# Patient Record
Sex: MALE | Race: WHITE | NOT HISPANIC OR LATINO | Employment: OTHER | URBAN - METROPOLITAN AREA
[De-identification: names, ages, dates, MRNs, and addresses within clinical notes are randomized per-mention and may not be internally consistent; named-entity substitution may affect disease eponyms.]

---

## 2021-04-20 ENCOUNTER — AMBULATORY - HEALTHEAST (OUTPATIENT)
Dept: SURGERY | Facility: CLINIC | Age: 76
End: 2021-04-20

## 2021-04-20 DIAGNOSIS — Z11.59 ENCOUNTER FOR SCREENING FOR OTHER VIRAL DISEASES: ICD-10-CM

## 2021-04-22 ENCOUNTER — AMBULATORY - HEALTHEAST (OUTPATIENT)
Dept: MULTI SPECIALTY CLINIC | Facility: CLINIC | Age: 76
End: 2021-04-22

## 2021-04-22 LAB
ALT SERPL W/O P-5'-P-CCNC: 34 IU/L (ref 8–45)
AST SERPL-CCNC: 34 IU/L (ref 2–40)

## 2021-05-04 ENCOUNTER — RECORDS - HEALTHEAST (OUTPATIENT)
Dept: ADMINISTRATIVE | Facility: OTHER | Age: 76
End: 2021-05-04

## 2021-05-04 ASSESSMENT — MIFFLIN-ST. JEOR: SCORE: 1592.21

## 2021-05-05 ENCOUNTER — RECORDS - HEALTHEAST (OUTPATIENT)
Dept: ADMINISTRATIVE | Facility: OTHER | Age: 76
End: 2021-05-05

## 2021-05-05 ASSESSMENT — MIFFLIN-ST. JEOR: SCORE: 1628.5

## 2021-05-09 ENCOUNTER — ANESTHESIA - HEALTHEAST (OUTPATIENT)
Dept: SURGERY | Facility: CLINIC | Age: 76
End: 2021-05-09

## 2021-05-10 ENCOUNTER — SURGERY - HEALTHEAST (OUTPATIENT)
Dept: SURGERY | Facility: CLINIC | Age: 76
End: 2021-05-10
Payer: COMMERCIAL

## 2021-05-10 ENCOUNTER — RECORDS - HEALTHEAST (OUTPATIENT)
Dept: ADMINISTRATIVE | Facility: OTHER | Age: 76
End: 2021-05-10

## 2021-05-10 ASSESSMENT — MIFFLIN-ST. JEOR: SCORE: 1611.78

## 2021-05-27 VITALS — HEIGHT: 69 IN | WEIGHT: 190 LBS | BODY MASS INDEX: 28.06 KG/M2

## 2021-05-27 VITALS — BODY MASS INDEX: 28.69 KG/M2 | WEIGHT: 194.31 LBS | BODY MASS INDEX: 29.24 KG/M2 | HEIGHT: 69 IN

## 2021-05-27 VITALS — HEIGHT: 69 IN | BODY MASS INDEX: 28.06 KG/M2

## 2021-05-27 VITALS — WEIGHT: 198 LBS | BODY MASS INDEX: 29.24 KG/M2

## 2021-06-16 PROBLEM — Z96.651 STATUS POST REVISION OF TOTAL REPLACEMENT OF RIGHT KNEE: Status: ACTIVE | Noted: 2021-05-11

## 2021-06-17 NOTE — ANESTHESIA POSTPROCEDURE EVALUATION
Patient: Satya Maldonado  Procedure(s):  REVISION, TOTAL KNEE  ARTHROPLASTY (Right)  Anesthesia type: spinal    Patient location: PACU  Last vitals:   Vitals Value Taken Time   /68 05/10/21 1700   Temp 36.1  C (97  F) 05/10/21 1640   Pulse 79 05/10/21 1710   Resp 16 05/10/21 1700   SpO2 94 % 05/10/21 1710   Vitals shown include unvalidated device data.  Post vital signs: stable  Level of consciousness: awake and responds to simple questions  Post-anesthesia pain: pain controlled  Post-anesthesia nausea and vomiting: no  Pulmonary: unassisted, return to baseline  Cardiovascular: stable and blood pressure at baseline  Hydration: adequate  Anesthetic events: no    QCDR Measures:  ASA# 11 - Carmita-op Cardiac Arrest: ASA11B - Patient did NOT experience unanticipated cardiac arrest  ASA# 12 - Carmita-op Mortality Rate: ASA12B - Patient did NOT die  ASA# 13 - PACU Re-Intubation Rate: ASA13B - Patient did NOT require a new airway mgmt  ASA# 10 - Composite Anes Safety: ASA10A - No serious adverse event    Additional Notes:

## 2021-06-17 NOTE — ANESTHESIA PROCEDURE NOTES
Peripheral Block    Patient location during procedure: pre-op  Start time: 5/10/2021 12:15 PM  End time: 5/10/2021 12:18 PM  post-op analgesia per surgeon order as noted in medical record  Staffing:  Performing  Anesthesiologist: Chantal Santacruz MD  Preanesthetic Checklist  Completed: patient identified, site marked, risks, benefits, and alternatives discussed, timeout performed, consent obtained, airway assessed, oxygen available, suction available, emergency drugs available and hand hygiene performed  Peripheral Block  Block type: other, tibial  Prep: ChloraPrep  Patient position: supine  Patient monitoring: cardiac monitor, continuous pulse oximetry, heart rate and blood pressure  Laterality: right  Injection technique: ultrasound guided    Ultrasound used to visualize needle placement in proximity to nerve being blocked: yes   US used to visualize anesthetic spread  Visualized anatomic structures normal  No Pathological Findings  Permanent ultrasound image captured for medical record  Sterile gel and probe cover used for ultrasound.  Needle  Needle type: echogenic   Needle gauge: 20G  Needle length: 4 in  no peripheral nerve catheter placed  Assessment  Injection assessment: no difficulty with injection, negative aspiration for heme, no paresthesia on injection and incremental injection  Additional Notes    Chantal Santacruz MD  Anesthesiologist  [facility]

## 2021-06-17 NOTE — ANESTHESIA CARE TRANSFER NOTE
Last vitals:   Vitals:    05/10/21 1610   BP: 123/67   Pulse: (P) 72   Resp: (P) 16   Temp: (P) 36.2  C (97.2  F)   SpO2: (P) 98%     Patient's level of consciousness is drowsy  Spontaneous respirations: yes  Maintains airway independently: yes  Dentition unchanged: yes  Oropharynx: oropharynx clear of all foreign objects    QCDR Measures:  ASA# 20 - Surgical Safety Checklist: WHO surgical safety checklist completed prior to induction    PQRS# 430 - Adult PONV Prevention: 4558F - Pt received => 2 anti-emetic agents (different classes) preop & intraop  ASA# 8 - Peds PONV Prevention: NA - Not pediatric patient, not GA or 2 or more risk factors NOT present  PQRS# 424 - Carmita-op Temp Management: 4559F - At least one body temp DOCUMENTED => 35.5C or 95.9F within required timeframe  PQRS# 426 - PACU Transfer Protocol: - Transfer of care checklist used  ASA# 14 - Acute Post-op Pain: ASA14B - Patient did NOT experience pain >= 7 out of 10

## 2021-06-17 NOTE — ANESTHESIA PREPROCEDURE EVALUATION
Anesthesia Evaluation      Patient summary reviewed   No history of anesthetic complications     Airway   Mallampati: III  Neck ROM: full   Pulmonary - negative ROS and normal exam                          Cardiovascular   Exercise tolerance: > or = 4 METS  (+) , hypercholesterolemia,     (-) dysrhythmias, angina  Rhythm: regular        Neuro/Psych - negative ROS     Endo/Other    (+) arthritis,      GI/Hepatic/Renal - negative ROS      Other findings:     NPO > 8 hrs        Dental - normal exam                        Anesthesia Plan  Planned anesthetic: spinal and peripheral nerve block    ASA 2     Anesthetic plan and risks discussed with: patient  Anesthesia plan special considerations: antiemetics,   Post-op plan: routine recovery

## 2021-06-17 NOTE — ANESTHESIA PROCEDURE NOTES
Peripheral Block    Patient location during procedure: pre-op  Start time: 5/10/2021 12:18 PM  End time: 5/10/2021 12:19 PM  post-op analgesia per surgeon order as noted in medical record  Staffing:  Performing  Anesthesiologist: Chantal Santacruz MD  Preanesthetic Checklist  Completed: patient identified, site marked, risks, benefits, and alternatives discussed, timeout performed, consent obtained, airway assessed, oxygen available, suction available, emergency drugs available and hand hygiene performed  Peripheral Block  Block type: saphenous, adductor canal block  Prep: ChloraPrep  Patient position: supine  Patient monitoring: cardiac monitor, continuous pulse oximetry, heart rate and blood pressure  Laterality: right  Injection technique: ultrasound guided    Ultrasound used to visualize needle placement in proximity to nerve being blocked: yes   US used to visualize anesthetic spread  Visualized anatomic structures normal  No Pathological Findings  Permanent ultrasound image captured for medical record  Sterile gel and probe cover used for ultrasound.  Needle  Needle type: echogenic   Needle gauge: 20G  Needle length: 4 in  no peripheral nerve catheter placed  Assessment  Injection assessment: no difficulty with injection, negative aspiration for heme, no paresthesia on injection and incremental injection  Additional Notes    Chantal Santacruz MD  Anesthesiologist  [facility]

## 2021-06-17 NOTE — ANESTHESIA PROCEDURE NOTES
Spinal Block    Patient location during procedure: OR  Start time: 5/10/2021 12:41 PM  End time: 5/10/2021 12:48 PM  Reason for block: primary anesthetic    Staffing:  Performing  Anesthesiologist: Chantal Santacruz MD    Preanesthetic Checklist  Completed: patient identified, risks, benefits, and alternatives discussed, timeout performed, consent obtained, airway assessed, oxygen available, suction available, emergency drugs available and hand hygiene performed  Spinal Block  Patient position: sitting  Prep: ChloraPrep and site prepped and draped  Patient monitoring: heart rate, continuous pulse ox and blood pressure  Approach: midline  Location: L3-4  Injection technique: single-shot  Needle type: pencil-tip   Needle gauge: 24 G      Additional Notes:    Patient tolerated procedure well.      Chantal Santacruz MD  Anesthesiologist  [facility]

## 2021-06-23 ENCOUNTER — RECORDS - HEALTHEAST (OUTPATIENT)
Dept: ADMINISTRATIVE | Facility: OTHER | Age: 76
End: 2021-06-23

## 2021-06-29 DIAGNOSIS — Z11.59 ENCOUNTER FOR SCREENING FOR OTHER VIRAL DISEASES: ICD-10-CM

## 2021-09-17 RX ORDER — CALCIUM/MAGNESIUM/ZINC 333-133 MG
1 TABLET ORAL DAILY
COMMUNITY

## 2021-09-23 NOTE — PROVIDER NOTIFICATION
Discharge plan according to [facility]:       09/17/21 0912   Discharge Planning   Patient/Family Anticipates Transition to home   Living Arrangements   People in home spouse   Type of Residence Private Residence   Is your private residence a single family home or apartment? Single family home   Bathroom Shower/Tub Walk-in shower   Support System   Support Systems Spouse/Significant Other

## 2021-09-29 ENCOUNTER — ANESTHESIA EVENT (OUTPATIENT)
Dept: SURGERY | Facility: CLINIC | Age: 76
End: 2021-09-29
Payer: MEDICARE

## 2021-09-30 ENCOUNTER — ANCILLARY PROCEDURE (OUTPATIENT)
Dept: ULTRASOUND IMAGING | Facility: CLINIC | Age: 76
End: 2021-09-30
Attending: ANESTHESIOLOGY
Payer: MEDICARE

## 2021-09-30 ENCOUNTER — HOSPITAL ENCOUNTER (OUTPATIENT)
Facility: CLINIC | Age: 76
Discharge: HOME | End: 2021-10-01
Attending: ORTHOPAEDIC SURGERY | Admitting: ORTHOPAEDIC SURGERY
Payer: MEDICARE

## 2021-09-30 ENCOUNTER — APPOINTMENT (OUTPATIENT)
Dept: PHYSICAL THERAPY | Facility: CLINIC | Age: 76
End: 2021-09-30
Attending: ORTHOPAEDIC SURGERY
Payer: MEDICARE

## 2021-09-30 ENCOUNTER — ANESTHESIA (OUTPATIENT)
Dept: SURGERY | Facility: CLINIC | Age: 76
End: 2021-09-30
Payer: MEDICARE

## 2021-09-30 ENCOUNTER — APPOINTMENT (OUTPATIENT)
Dept: RADIOLOGY | Facility: CLINIC | Age: 76
End: 2021-09-30
Attending: PHYSICIAN ASSISTANT
Payer: MEDICARE

## 2021-09-30 DIAGNOSIS — Z96.652 STATUS POST REVISION OF TOTAL REPLACEMENT OF LEFT KNEE: Primary | ICD-10-CM

## 2021-09-30 PROBLEM — T84.093D: Status: ACTIVE | Noted: 2021-09-30

## 2021-09-30 LAB
ABO/RH(D): NORMAL
ANTIBODY SCREEN: NEGATIVE
APPEARANCE FLD: ABNORMAL
COLOR FLD: YELLOW
GRAM STAIN RESULT: ABNORMAL
GRAM STAIN RESULT: ABNORMAL
GRAM STAIN RESULT: NORMAL
GRAM STAIN RESULT: NORMAL
LYMPHOCYTES NFR FLD MANUAL: 70 %
MONOS+MACROS NFR FLD MANUAL: 12 %
NEUTS BAND NFR FLD MANUAL: 18 %
RBC # FLD: ABNORMAL /UL
SPECIMEN EXPIRATION DATE: NORMAL
WBC # FLD AUTO: 600 /UL

## 2021-09-30 PROCEDURE — 999N000141 HC STATISTIC PRE-PROCEDURE NURSING ASSESSMENT: Performed by: ORTHOPAEDIC SURGERY

## 2021-09-30 PROCEDURE — 250N000009 HC RX 250: Performed by: ANESTHESIOLOGY

## 2021-09-30 PROCEDURE — 250N000009 HC RX 250: Performed by: PHYSICIAN ASSISTANT

## 2021-09-30 PROCEDURE — 250N000011 HC RX IP 250 OP 636: Performed by: NURSE ANESTHETIST, CERTIFIED REGISTERED

## 2021-09-30 PROCEDURE — 250N000011 HC RX IP 250 OP 636: Performed by: ANESTHESIOLOGY

## 2021-09-30 PROCEDURE — 258N000003 HC RX IP 258 OP 636: Performed by: PHYSICIAN ASSISTANT

## 2021-09-30 PROCEDURE — 258N000001 HC RX 258: Performed by: ORTHOPAEDIC SURGERY

## 2021-09-30 PROCEDURE — 250N000011 HC RX IP 250 OP 636: Performed by: PHYSICIAN ASSISTANT

## 2021-09-30 PROCEDURE — 250N000013 HC RX MED GY IP 250 OP 250 PS 637: Performed by: PHYSICIAN ASSISTANT

## 2021-09-30 PROCEDURE — 97161 PT EVAL LOW COMPLEX 20 MIN: CPT | Mod: GP

## 2021-09-30 PROCEDURE — 258N000003 HC RX IP 258 OP 636: Performed by: ANESTHESIOLOGY

## 2021-09-30 PROCEDURE — 278N000051 HC OR IMPLANT GENERAL: Performed by: ORTHOPAEDIC SURGERY

## 2021-09-30 PROCEDURE — 97110 THERAPEUTIC EXERCISES: CPT | Mod: GP

## 2021-09-30 PROCEDURE — C1776 JOINT DEVICE (IMPLANTABLE): HCPCS | Performed by: ORTHOPAEDIC SURGERY

## 2021-09-30 PROCEDURE — 370N000017 HC ANESTHESIA TECHNICAL FEE, PER MIN: Performed by: ORTHOPAEDIC SURGERY

## 2021-09-30 PROCEDURE — 710N000010 HC RECOVERY PHASE 1, LEVEL 2, PER MIN: Performed by: ORTHOPAEDIC SURGERY

## 2021-09-30 PROCEDURE — 89051 BODY FLUID CELL COUNT: CPT | Performed by: ORTHOPAEDIC SURGERY

## 2021-09-30 PROCEDURE — 360N000078 HC SURGERY LEVEL 5, PER MIN: Performed by: ORTHOPAEDIC SURGERY

## 2021-09-30 PROCEDURE — 999N000065 XR KNEE PORT LEFT 1/2 VIEWS

## 2021-09-30 PROCEDURE — 272N000001 HC OR GENERAL SUPPLY STERILE: Performed by: ORTHOPAEDIC SURGERY

## 2021-09-30 PROCEDURE — 250N000013 HC RX MED GY IP 250 OP 250 PS 637: Performed by: ANESTHESIOLOGY

## 2021-09-30 PROCEDURE — 89050 BODY FLUID CELL COUNT: CPT | Performed by: ORTHOPAEDIC SURGERY

## 2021-09-30 PROCEDURE — 36415 COLL VENOUS BLD VENIPUNCTURE: CPT | Performed by: PHYSICIAN ASSISTANT

## 2021-09-30 PROCEDURE — 97116 GAIT TRAINING THERAPY: CPT | Mod: GP

## 2021-09-30 PROCEDURE — 87205 SMEAR GRAM STAIN: CPT | Performed by: ORTHOPAEDIC SURGERY

## 2021-09-30 PROCEDURE — 87075 CULTR BACTERIA EXCEPT BLOOD: CPT | Performed by: ORTHOPAEDIC SURGERY

## 2021-09-30 PROCEDURE — 87070 CULTURE OTHR SPECIMN AEROBIC: CPT | Performed by: ORTHOPAEDIC SURGERY

## 2021-09-30 PROCEDURE — 250N000009 HC RX 250: Performed by: NURSE ANESTHETIST, CERTIFIED REGISTERED

## 2021-09-30 PROCEDURE — 86900 BLOOD TYPING SEROLOGIC ABO: CPT | Performed by: PHYSICIAN ASSISTANT

## 2021-09-30 DEVICE — SIGMA FEMORAL TC3 CEMENTED 4 LEFT
Type: IMPLANTABLE DEVICE | Site: KNEE | Status: FUNCTIONAL
Brand: SIGMA

## 2021-09-30 DEVICE — P.F.C. SIGMA FEMORAL ADAPTER BOLT +2/-2
Type: IMPLANTABLE DEVICE | Site: KNEE | Status: FUNCTIONAL
Brand: P.F.C. SIGMA

## 2021-09-30 DEVICE — CEMENT BONE SIMPLEX HV 40G W/GENTA .5G: Type: IMPLANTABLE DEVICE | Site: KNEE | Status: FUNCTIONAL

## 2021-09-30 DEVICE — P.F.C. SIGMA POSTERIOR AUGMENT COMBO CEMENTED SIZE 4 8MM
Type: IMPLANTABLE DEVICE | Site: KNEE | Status: FUNCTIONAL
Brand: P.F.C. SIGMA

## 2021-09-30 DEVICE — UNIVERSAL STEM FLUTED 75MM X 20MM: Type: IMPLANTABLE DEVICE | Site: KNEE | Status: FUNCTIONAL

## 2021-09-30 DEVICE — UNIVERSAL FEMORAL SLEEVE FULL POROUS 31MM: Type: IMPLANTABLE DEVICE | Site: KNEE | Status: FUNCTIONAL

## 2021-09-30 DEVICE — TIBIAL TRAY ROTATING PLATFORM M.B.T. REVISION SIZE 4 CEMENTED: Type: IMPLANTABLE DEVICE | Site: KNEE | Status: FUNCTIONAL

## 2021-09-30 DEVICE — P.F.C. SIGMA FEMORAL ADAPTER 5 DEGREE
Type: IMPLANTABLE DEVICE | Site: KNEE | Status: FUNCTIONAL
Brand: P.F.C. SIGMA

## 2021-09-30 DEVICE — UNIVERSAL STEM FLUTED 75MM X 18MM: Type: IMPLANTABLE DEVICE | Site: KNEE | Status: FUNCTIONAL

## 2021-09-30 DEVICE — SIGMA TIBIAL INSERT ROTATING PLATFORM TC3 SIZE 4 15MM GVF
Type: IMPLANTABLE DEVICE | Site: KNEE | Status: FUNCTIONAL
Brand: SIGMA

## 2021-09-30 DEVICE — M.B.T. REVISION METAPHYSEAL SLEEVE POROUS 61MM: Type: IMPLANTABLE DEVICE | Site: KNEE | Status: FUNCTIONAL

## 2021-09-30 RX ORDER — CEFAZOLIN SODIUM 2 G/100ML
2 INJECTION, SOLUTION INTRAVENOUS
Status: COMPLETED | OUTPATIENT
Start: 2021-09-30 | End: 2021-09-30

## 2021-09-30 RX ORDER — FENTANYL CITRATE 50 UG/ML
50 INJECTION, SOLUTION INTRAMUSCULAR; INTRAVENOUS
Status: DISCONTINUED | OUTPATIENT
Start: 2021-09-30 | End: 2021-09-30 | Stop reason: HOSPADM

## 2021-09-30 RX ORDER — BISACODYL 10 MG
10 SUPPOSITORY, RECTAL RECTAL DAILY PRN
Status: DISCONTINUED | OUTPATIENT
Start: 2021-09-30 | End: 2021-10-01 | Stop reason: HOSPADM

## 2021-09-30 RX ORDER — POLYETHYLENE GLYCOL 3350 17 G/17G
17 POWDER, FOR SOLUTION ORAL DAILY
Status: DISCONTINUED | OUTPATIENT
Start: 2021-10-01 | End: 2021-10-01 | Stop reason: HOSPADM

## 2021-09-30 RX ORDER — SODIUM CHLORIDE, SODIUM LACTATE, POTASSIUM CHLORIDE, CALCIUM CHLORIDE 600; 310; 30; 20 MG/100ML; MG/100ML; MG/100ML; MG/100ML
INJECTION, SOLUTION INTRAVENOUS CONTINUOUS
Status: DISCONTINUED | OUTPATIENT
Start: 2021-09-30 | End: 2021-10-01 | Stop reason: HOSPADM

## 2021-09-30 RX ORDER — PROPOFOL 10 MG/ML
INJECTION, EMULSION INTRAVENOUS PRN
Status: DISCONTINUED | OUTPATIENT
Start: 2021-09-30 | End: 2021-09-30

## 2021-09-30 RX ORDER — PROCHLORPERAZINE MALEATE 5 MG
5 TABLET ORAL EVERY 6 HOURS PRN
Status: DISCONTINUED | OUTPATIENT
Start: 2021-09-30 | End: 2021-10-01 | Stop reason: HOSPADM

## 2021-09-30 RX ORDER — ACETAMINOPHEN 325 MG/1
975 TABLET ORAL EVERY 8 HOURS
Status: DISCONTINUED | OUTPATIENT
Start: 2021-09-30 | End: 2021-10-01 | Stop reason: HOSPADM

## 2021-09-30 RX ORDER — ONDANSETRON 4 MG/1
4 TABLET, ORALLY DISINTEGRATING ORAL EVERY 30 MIN PRN
Status: DISCONTINUED | OUTPATIENT
Start: 2021-09-30 | End: 2021-09-30 | Stop reason: HOSPADM

## 2021-09-30 RX ORDER — HYDROMORPHONE HCL IN WATER/PF 6 MG/30 ML
0.4 PATIENT CONTROLLED ANALGESIA SYRINGE INTRAVENOUS EVERY 5 MIN PRN
Status: DISCONTINUED | OUTPATIENT
Start: 2021-09-30 | End: 2021-09-30 | Stop reason: HOSPADM

## 2021-09-30 RX ORDER — MAGNESIUM SULFATE 4 G/50ML
4 INJECTION INTRAVENOUS ONCE
Status: COMPLETED | OUTPATIENT
Start: 2021-09-30 | End: 2021-09-30

## 2021-09-30 RX ORDER — TRANEXAMIC ACID 650 MG/1
1950 TABLET ORAL ONCE
Status: COMPLETED | OUTPATIENT
Start: 2021-09-30 | End: 2021-09-30

## 2021-09-30 RX ORDER — ONDANSETRON 4 MG/1
4 TABLET, ORALLY DISINTEGRATING ORAL EVERY 6 HOURS PRN
Status: DISCONTINUED | OUTPATIENT
Start: 2021-09-30 | End: 2021-10-01 | Stop reason: HOSPADM

## 2021-09-30 RX ORDER — HYDROMORPHONE HCL IN WATER/PF 6 MG/30 ML
0.2 PATIENT CONTROLLED ANALGESIA SYRINGE INTRAVENOUS
Status: DISCONTINUED | OUTPATIENT
Start: 2021-09-30 | End: 2021-10-01 | Stop reason: HOSPADM

## 2021-09-30 RX ORDER — SODIUM CHLORIDE, SODIUM LACTATE, POTASSIUM CHLORIDE, CALCIUM CHLORIDE 600; 310; 30; 20 MG/100ML; MG/100ML; MG/100ML; MG/100ML
INJECTION, SOLUTION INTRAVENOUS CONTINUOUS
Status: DISCONTINUED | OUTPATIENT
Start: 2021-09-30 | End: 2021-09-30 | Stop reason: HOSPADM

## 2021-09-30 RX ORDER — ACETAMINOPHEN 325 MG/1
650 TABLET ORAL EVERY 4 HOURS PRN
Status: DISCONTINUED | OUTPATIENT
Start: 2021-10-03 | End: 2021-10-01 | Stop reason: HOSPADM

## 2021-09-30 RX ORDER — ACETAMINOPHEN 325 MG/1
975 TABLET ORAL ONCE
Status: COMPLETED | OUTPATIENT
Start: 2021-09-30 | End: 2021-09-30

## 2021-09-30 RX ORDER — CEFAZOLIN SODIUM 2 G/100ML
2 INJECTION, SOLUTION INTRAVENOUS EVERY 8 HOURS
Status: COMPLETED | OUTPATIENT
Start: 2021-09-30 | End: 2021-10-01

## 2021-09-30 RX ORDER — IBUPROFEN 600 MG/1
600 TABLET, FILM COATED ORAL EVERY 6 HOURS PRN
Qty: 30 TABLET | Refills: 0 | Status: SHIPPED | OUTPATIENT
Start: 2021-09-30

## 2021-09-30 RX ORDER — HYDROXYZINE HYDROCHLORIDE 10 MG/1
10 TABLET, FILM COATED ORAL EVERY 6 HOURS PRN
Qty: 30 TABLET | Refills: 0 | Status: SHIPPED | OUTPATIENT
Start: 2021-09-30

## 2021-09-30 RX ORDER — CEFAZOLIN SODIUM 2 G/100ML
2 INJECTION, SOLUTION INTRAVENOUS SEE ADMIN INSTRUCTIONS
Status: DISCONTINUED | OUTPATIENT
Start: 2021-09-30 | End: 2021-09-30 | Stop reason: HOSPADM

## 2021-09-30 RX ORDER — NAPROXEN 250 MG/1
250 TABLET ORAL EVERY 12 HOURS PRN
Status: DISCONTINUED | OUTPATIENT
Start: 2021-09-30 | End: 2021-10-01 | Stop reason: HOSPADM

## 2021-09-30 RX ORDER — NALOXONE HYDROCHLORIDE 0.4 MG/ML
0.2 INJECTION, SOLUTION INTRAMUSCULAR; INTRAVENOUS; SUBCUTANEOUS
Status: DISCONTINUED | OUTPATIENT
Start: 2021-09-30 | End: 2021-10-01 | Stop reason: HOSPADM

## 2021-09-30 RX ORDER — LIDOCAINE 40 MG/G
CREAM TOPICAL
Status: DISCONTINUED | OUTPATIENT
Start: 2021-09-30 | End: 2021-10-01 | Stop reason: HOSPADM

## 2021-09-30 RX ORDER — LIDOCAINE 40 MG/G
CREAM TOPICAL
Status: DISCONTINUED | OUTPATIENT
Start: 2021-09-30 | End: 2021-09-30 | Stop reason: HOSPADM

## 2021-09-30 RX ORDER — FENTANYL CITRATE 50 UG/ML
INJECTION, SOLUTION INTRAMUSCULAR; INTRAVENOUS PRN
Status: DISCONTINUED | OUTPATIENT
Start: 2021-09-30 | End: 2021-09-30

## 2021-09-30 RX ORDER — AMOXICILLIN 250 MG
1 CAPSULE ORAL 2 TIMES DAILY
Status: DISCONTINUED | OUTPATIENT
Start: 2021-09-30 | End: 2021-10-01 | Stop reason: HOSPADM

## 2021-09-30 RX ORDER — BUPIVACAINE HYDROCHLORIDE 7.5 MG/ML
INJECTION, SOLUTION INTRASPINAL
Status: COMPLETED | OUTPATIENT
Start: 2021-09-30 | End: 2021-09-30

## 2021-09-30 RX ORDER — LIDOCAINE HYDROCHLORIDE 10 MG/ML
INJECTION, SOLUTION INFILTRATION; PERINEURAL PRN
Status: DISCONTINUED | OUTPATIENT
Start: 2021-09-30 | End: 2021-09-30

## 2021-09-30 RX ORDER — DIPHENHYDRAMINE HCL 12.5MG/5ML
12.5 LIQUID (ML) ORAL EVERY 6 HOURS PRN
Status: DISCONTINUED | OUTPATIENT
Start: 2021-09-30 | End: 2021-10-01 | Stop reason: HOSPADM

## 2021-09-30 RX ORDER — BUPIVACAINE HYDROCHLORIDE AND EPINEPHRINE 5; 5 MG/ML; UG/ML
INJECTION, SOLUTION PERINEURAL
Status: COMPLETED | OUTPATIENT
Start: 2021-09-30 | End: 2021-09-30

## 2021-09-30 RX ORDER — LABETALOL HYDROCHLORIDE 5 MG/ML
10 INJECTION, SOLUTION INTRAVENOUS
Status: DISCONTINUED | OUTPATIENT
Start: 2021-09-30 | End: 2021-09-30 | Stop reason: HOSPADM

## 2021-09-30 RX ORDER — DEXAMETHASONE SODIUM PHOSPHATE 10 MG/ML
INJECTION, SOLUTION INTRAMUSCULAR; INTRAVENOUS PRN
Status: DISCONTINUED | OUTPATIENT
Start: 2021-09-30 | End: 2021-09-30

## 2021-09-30 RX ORDER — FENTANYL CITRATE 50 UG/ML
50 INJECTION, SOLUTION INTRAMUSCULAR; INTRAVENOUS EVERY 5 MIN PRN
Status: DISCONTINUED | OUTPATIENT
Start: 2021-09-30 | End: 2021-09-30 | Stop reason: HOSPADM

## 2021-09-30 RX ORDER — PROPOFOL 10 MG/ML
INJECTION, EMULSION INTRAVENOUS CONTINUOUS PRN
Status: DISCONTINUED | OUTPATIENT
Start: 2021-09-30 | End: 2021-09-30

## 2021-09-30 RX ORDER — NALOXONE HYDROCHLORIDE 0.4 MG/ML
0.4 INJECTION, SOLUTION INTRAMUSCULAR; INTRAVENOUS; SUBCUTANEOUS
Status: DISCONTINUED | OUTPATIENT
Start: 2021-09-30 | End: 2021-10-01 | Stop reason: HOSPADM

## 2021-09-30 RX ORDER — HYDROXYZINE HYDROCHLORIDE 10 MG/1
10 TABLET, FILM COATED ORAL EVERY 6 HOURS PRN
Status: DISCONTINUED | OUTPATIENT
Start: 2021-09-30 | End: 2021-10-01 | Stop reason: HOSPADM

## 2021-09-30 RX ORDER — ONDANSETRON 2 MG/ML
4 INJECTION INTRAMUSCULAR; INTRAVENOUS EVERY 6 HOURS PRN
Status: DISCONTINUED | OUTPATIENT
Start: 2021-09-30 | End: 2021-10-01 | Stop reason: HOSPADM

## 2021-09-30 RX ORDER — OXYCODONE HYDROCHLORIDE 5 MG/1
5 TABLET ORAL EVERY 4 HOURS PRN
Status: DISCONTINUED | OUTPATIENT
Start: 2021-09-30 | End: 2021-09-30 | Stop reason: HOSPADM

## 2021-09-30 RX ORDER — ACETAMINOPHEN 325 MG/1
650 TABLET ORAL EVERY 4 HOURS PRN
Qty: 100 TABLET | Refills: 0 | Status: SHIPPED | OUTPATIENT
Start: 2021-09-30

## 2021-09-30 RX ORDER — ONDANSETRON 2 MG/ML
INJECTION INTRAMUSCULAR; INTRAVENOUS PRN
Status: DISCONTINUED | OUTPATIENT
Start: 2021-09-30 | End: 2021-09-30

## 2021-09-30 RX ORDER — OXYCODONE HYDROCHLORIDE 5 MG/1
10 TABLET ORAL EVERY 4 HOURS PRN
Status: DISCONTINUED | OUTPATIENT
Start: 2021-09-30 | End: 2021-10-01 | Stop reason: HOSPADM

## 2021-09-30 RX ORDER — HYDROMORPHONE HCL IN WATER/PF 6 MG/30 ML
0.4 PATIENT CONTROLLED ANALGESIA SYRINGE INTRAVENOUS
Status: DISCONTINUED | OUTPATIENT
Start: 2021-09-30 | End: 2021-10-01 | Stop reason: HOSPADM

## 2021-09-30 RX ORDER — OXYCODONE HYDROCHLORIDE 5 MG/1
5 TABLET ORAL EVERY 4 HOURS PRN
Status: DISCONTINUED | OUTPATIENT
Start: 2021-09-30 | End: 2021-10-01 | Stop reason: HOSPADM

## 2021-09-30 RX ORDER — AMOXICILLIN 250 MG
1-2 CAPSULE ORAL 2 TIMES DAILY
Qty: 30 TABLET | Refills: 1 | Status: SHIPPED | OUTPATIENT
Start: 2021-09-30

## 2021-09-30 RX ORDER — ASPIRIN 81 MG/1
81 TABLET ORAL 2 TIMES DAILY
Status: DISCONTINUED | OUTPATIENT
Start: 2021-09-30 | End: 2021-10-01 | Stop reason: HOSPADM

## 2021-09-30 RX ORDER — ONDANSETRON 2 MG/ML
4 INJECTION INTRAMUSCULAR; INTRAVENOUS EVERY 30 MIN PRN
Status: DISCONTINUED | OUTPATIENT
Start: 2021-09-30 | End: 2021-09-30 | Stop reason: HOSPADM

## 2021-09-30 RX ADMIN — ACETAMINOPHEN 975 MG: 325 TABLET ORAL at 14:52

## 2021-09-30 RX ADMIN — SENNOSIDES AND DOCUSATE SODIUM 1 TABLET: 50; 8.6 TABLET ORAL at 21:29

## 2021-09-30 RX ADMIN — LIDOCAINE HYDROCHLORIDE 30 MG: 10 INJECTION, SOLUTION INFILTRATION; PERINEURAL at 07:35

## 2021-09-30 RX ADMIN — MIDAZOLAM HYDROCHLORIDE 1 MG: 1 INJECTION, SOLUTION INTRAMUSCULAR; INTRAVENOUS at 07:51

## 2021-09-30 RX ADMIN — ASPIRIN 81 MG: 81 TABLET, COATED ORAL at 12:19

## 2021-09-30 RX ADMIN — SODIUM CHLORIDE, POTASSIUM CHLORIDE, SODIUM LACTATE AND CALCIUM CHLORIDE: 600; 310; 30; 20 INJECTION, SOLUTION INTRAVENOUS at 12:05

## 2021-09-30 RX ADMIN — SODIUM CHLORIDE, POTASSIUM CHLORIDE, SODIUM LACTATE AND CALCIUM CHLORIDE: 600; 310; 30; 20 INJECTION, SOLUTION INTRAVENOUS at 08:03

## 2021-09-30 RX ADMIN — PROPOFOL 75 MCG/KG/MIN: 10 INJECTION, EMULSION INTRAVENOUS at 07:35

## 2021-09-30 RX ADMIN — SODIUM CHLORIDE, POTASSIUM CHLORIDE, SODIUM LACTATE AND CALCIUM CHLORIDE: 600; 310; 30; 20 INJECTION, SOLUTION INTRAVENOUS at 14:51

## 2021-09-30 RX ADMIN — SODIUM CHLORIDE: 900 IRRIGANT IRRIGATION at 08:30

## 2021-09-30 RX ADMIN — ONDANSETRON 4 MG: 2 INJECTION INTRAMUSCULAR; INTRAVENOUS at 08:03

## 2021-09-30 RX ADMIN — CEFAZOLIN SODIUM 2 G: 2 INJECTION, SOLUTION INTRAVENOUS at 14:53

## 2021-09-30 RX ADMIN — SENNOSIDES AND DOCUSATE SODIUM 1 TABLET: 50; 8.6 TABLET ORAL at 12:19

## 2021-09-30 RX ADMIN — TRANEXAMIC ACID 1950 MG: 650 TABLET ORAL at 06:06

## 2021-09-30 RX ADMIN — BUPIVACAINE HYDROCHLORIDE IN DEXTROSE 2 ML: 7.5 INJECTION, SOLUTION SUBARACHNOID at 07:32

## 2021-09-30 RX ADMIN — SODIUM CHLORIDE, POTASSIUM CHLORIDE, SODIUM LACTATE AND CALCIUM CHLORIDE: 600; 310; 30; 20 INJECTION, SOLUTION INTRAVENOUS at 06:34

## 2021-09-30 RX ADMIN — FENTANYL CITRATE 50 MCG: 50 INJECTION, SOLUTION INTRAMUSCULAR; INTRAVENOUS at 07:27

## 2021-09-30 RX ADMIN — ACETAMINOPHEN 975 MG: 325 TABLET ORAL at 21:28

## 2021-09-30 RX ADMIN — MIDAZOLAM HYDROCHLORIDE 2 MG: 1 INJECTION, SOLUTION INTRAMUSCULAR; INTRAVENOUS at 06:58

## 2021-09-30 RX ADMIN — ACETAMINOPHEN 975 MG: 325 TABLET ORAL at 06:06

## 2021-09-30 RX ADMIN — CEFAZOLIN SODIUM 2 G: 2 INJECTION, SOLUTION INTRAVENOUS at 07:24

## 2021-09-30 RX ADMIN — ASPIRIN 81 MG: 81 TABLET, COATED ORAL at 21:29

## 2021-09-30 RX ADMIN — BUPIVACAINE HYDROCHLORIDE AND EPINEPHRINE BITARTRATE 15 ML: 5; .005 INJECTION, SOLUTION PERINEURAL at 07:19

## 2021-09-30 RX ADMIN — PROPOFOL 40 MG: 10 INJECTION, EMULSION INTRAVENOUS at 07:35

## 2021-09-30 RX ADMIN — MAGNESIUM SULFATE HEPTAHYDRATE 4 G: 80 INJECTION, SOLUTION INTRAVENOUS at 06:39

## 2021-09-30 RX ADMIN — FENTANYL CITRATE 100 MCG: 50 INJECTION, SOLUTION INTRAMUSCULAR; INTRAVENOUS at 06:58

## 2021-09-30 RX ADMIN — MIDAZOLAM HYDROCHLORIDE 1 MG: 1 INJECTION, SOLUTION INTRAMUSCULAR; INTRAVENOUS at 07:27

## 2021-09-30 RX ADMIN — DEXAMETHASONE SODIUM PHOSPHATE 10 MG: 10 INJECTION, SOLUTION INTRAMUSCULAR; INTRAVENOUS at 08:03

## 2021-09-30 RX ADMIN — HYDROXYZINE HYDROCHLORIDE 10 MG: 10 TABLET ORAL at 21:29

## 2021-09-30 ASSESSMENT — ACTIVITIES OF DAILY LIVING (ADL)
DIFFICULTY_EATING/SWALLOWING: NO
TOILETING_ISSUES: NO
DRESSING/BATHING_DIFFICULTY: NO
DOING_ERRANDS_INDEPENDENTLY_DIFFICULTY: NO
VISION_MANAGEMENT: GLASSES ALL THE TIME
PATIENT_/_FAMILY_COMMUNICATION_STYLE: SPOKEN LANGUAGE (ENGLISH OR BILINGUAL)
HEARING_DIFFICULTY_OR_DEAF: NO
DIFFICULTY_COMMUNICATING: NO
CONCENTRATING,_REMEMBERING_OR_MAKING_DECISIONS_DIFFICULTY: NO
WALKING_OR_CLIMBING_STAIRS_DIFFICULTY: NO
FALL_HISTORY_WITHIN_LAST_SIX_MONTHS: NO
WEAR_GLASSES_OR_BLIND: YES

## 2021-09-30 ASSESSMENT — MIFFLIN-ST. JEOR: SCORE: 1594.25

## 2021-09-30 NOTE — ANESTHESIA POSTPROCEDURE EVALUATION
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    Anesthesia Type:  No value filed.    Note:  Disposition: Inpatient   Postop Pain Control: Uneventful            Sign Out: Well controlled pain   PONV: No   Neuro/Psych: Uneventful            Sign Out: Acceptable/Baseline neuro status   Airway/Respiratory: Uneventful            Sign Out: Acceptable/Baseline resp. status   CV/Hemodynamics: Uneventful            Sign Out: Acceptable CV status; No obvious hypovolemia; No obvious fluid overload   Other NRE: NONE   DID A NON-ROUTINE EVENT OCCUR? No           Last vitals:  Vitals Value Taken Time   /71 09/30/21 1040   Temp 36.4  C (97.6  F) 09/30/21 1021   Pulse 64 09/30/21 1046   Resp 15 09/30/21 1046   SpO2 91 % 09/30/21 1046   Vitals shown include unvalidated device data.    Electronically Signed By: Chencho Rajput MD  September 30, 2021  10:47 AM

## 2021-09-30 NOTE — OP NOTE
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 DePuy Sigma TC 3 cemented femoral component, size 4 left  2.  5 degree femoral adapter with +2 posterior adapter bolt  3.  8 mm posterior lateral augment  4.  Fully porous femoral sleeve, 31 mm  5.  75 x 20 mm press-fit fluted stem    Tibia:  1.  DePuy rotating platform revision tibial tray, size 4  2.  61 mm MBT revision metaphyseal sleeve  3.  75 x 18 mm press-fit fluted stem  4.  Sigma TC 3 rotating platform polyethylene, size 4,  15 mm    SURGEON  Vasu Guardado, DO     ASSISTANT   Kenny Duval PA-C. PA-C was needed for positioning, draping, retraction/protection of vital structures, assistance with instrumentation and correct implant placement, deep periarticular injection, closure including deep capsular layer and maintaining patient safety throughout the procedure.  Several of these duties can only be performed by a BRY and not a lesser trained surgical assistant.    ANESTHESIA  Spinal with Adductor Block      ESTIMATED BLOOD LOSS  50 mL    TOURNIQUET TIME:  80 minutes    COMPLICATIONS   None.      FINDINGS: Moderate straw-colored joint effusion.  No purulence.  Significant synovial hypertrophy secondary to polyethylene wear.  Peripheral bone loss of femoral component.  Femur well fixed.  Tibia not grossly loose.  Severe central tibial metaphyseal osteolysis with 1 small uncontained defect.    Specimens:   Synovial fluid analysis: 600 WBC, PMN 18%  Cultures x 2    INDICATION   Satya Maldonado is a 76 year old male who was seen in my office for bilateral knee pain status  post remote total knee arthroplasty.  X-rays showed significant osteolysis with polyethylene wear of both knees.  Patient underwent a very successful right knee revision.  Continued to have significant left knee pain.  Large metaphyseal osteolytic lesion in the tibia.  Recommended revision surgery on this side as well.  Infection work-up was negative preoperatively.  The risks and benefits of further non-operative treatment vs surgical treatment were discussed.  Satya Maldonado wished to proceed with surgery. They were cleared by a medical doctor for joint arthroplasty.    INFORMED CONSENT  Satya Maldonado was identified in the preoperative holding area and was identified using medical record number, name, and date of birth, all of which were confirmed. The left knee was marked using an indelible marker and informed consent was signed. Once again, all risks and benefits as well as alternatives to surgical intervention were discussed with the patient in detail and all the questions were answered. Risks discussed included but were not limited to: persistent pain, recurrent infection, bleeding, scarring, stiffness, thromboembolic events, fracture, malalignment/malrotation, failure to reimplant prosthesis, severe limb dysfunction, loss of limb, and loss of life. The patient signed informed consent and wished to proceed with surgery as scheduled.     TECHNIQUE   Satya Maldonado received a peripheral nerve block in the preoperative holding area.  Patient was taken to the operating room and placed on the operating table in a supine position. A spinal was then performed in the operating room.  A tourniquet was placed high on the operative thigh.  Care was taken to pad all bony prominences well. A CHANDRAKANT stocking was placed on the contralateral leg.  The operative extremity was then prepped and draped in a standard orthopedic fashion using DuraPrep. A preprocedural timeout was then performed once again confirming the  patient's correct identity, correct procedure, correct side, and correct site. In addition, allergy status and required equipment were reviewed. Three independent members of the operating room team agreed on the above-mentioned parameters.  It was also confirmed that the patient received IV antibiotics and TXA per protocol.    At this point the leg was elevated and the tourniquet was inflated.  An extensile midline approach to the knee was utilized, through the patient's previous incision.  Sharp dissection was performed down to the level of the capsule.  An 18-gauge needle was used to aspirate the joint. Fluid was obtained and sent for synovial fluid analysis.  An extensive median parapatellar arthrotomy was performed. Scar tissue from the superior pouch and gutters was resected to reestablish these areas.  There was significant hypertrophy due to polyethylene wear.  Soft tissue was cleared from around the femoral and tibial implants.  The knee was extended and anterior scar tissue behind the patellar tendon was excised.  The patellar tendon was elevated down to the level of the tubercle.  The polyethylene was then removed with a osteotome.  The posterior knee was also debrided of scar.  Tissue cultures were sent at this time.    Attention was then taken to the femur.  It was found to be osteolytic around the periphery, but the component was not grossly loose.  With a combination of the reciprocating saw and osteotomes the femoral component was freed up.  A drift was used to impact the femoral flange and it was removed with minimal bone loss.  All remaining cement was removed with a rongeur.  Overall femoral bone stock was in good condition other than some small peripheral areas.    Attention was now taken to the tibia.  This component was found to not be grossly loose.  There was overgrowth of proximal bone over the implant.  The reciprocating saw was used to free up the anterior aspect of the tray.  The  reciprocating saw was used to work around the keel of the implant and posterior aspect.  A drift was used to impact the tibial component and this was also removed with minimal bone loss.  All excess cement was removed from the surface of the tibia and in the keel.  There was a very large void in the proximal tibia to include approximately 75% of the metaphyseal bone.  Overall the rim of the tibia as well as the metaphyseal cortex was intact.  The best intact bone was posteromedial and lateral.    An opening reamer followed by sequential reaming was performed in the tibia.  This was removed and a sleeve reamer was then used followed by broaches.  The 61 mm broach gave good rotational and axial stability.  There was still an anteromedial defect that was not filled by the implant, however this was the large size and I did obtain a very good metaphyseal press-fit.  I plan to fill this void with cement.  The broach was left in place and a freshening cut was made at the top of the tibia.  Excess bone and scar tissue from around the tibia was removed.  Tibial tray was then appropriately sized.  I elected to use a size 4 which was slightly undersized in order to not overhang laterally.  There was some uncovered bone anteromedial including some of the bony defect, but again I plan to fill this with cement.  Broach was removed and tibial trial was created on the back table.  This was impacted and found to have good position and fit.    Attention turned back to the femur.  Opening reamer followed by sequential reaming was performed.  Initial broach reamer was then used followed by sequential broaching.  Good rotational and axial stability was found with a 31 mm broach.  Distal femoral cutting guide was then placed on the broach and pinned.  1 mm freshening cut was made.  I then appropriately sized the femur and placed a 4-in-1 cutting block which was pinned into place.  External rotation was checked based on epicondylar axis  and rectangular flexion gap.  This was placed in the 2 mm posterior position.  Cuts were made with a sagittal saw.  At this time it was determined that augments would be needed, 8 mm posterolateral.  4-in-1 block was removed and the central box cutting guide was placed.  Box was cut in all pieces were removed.  Trial femur was built to match and impacted into place.  Good fit and stability.    A 15 mm trial polyethylene was then placed.  Knee was taken through range of motion and found to have full extension and full flexion.  Varus/valgus stability was balanced in extension, mid flexion and full flexion.  Patella tracked well. Trial components were then all removed.  Knee was copiously irrigated and dried.      Final components were built on the back table to match the trials exactly.  Antibiotic cement was then mixed and components were sequentially cemented, tibia first followed by femur.  Excess cement was removed from the components.  Trial polyethylene was placed and cement was allowed to polymerize in full extension.  Knee was taken through 1 final range of motion and the 15 mm polyethylene was the appropriate size.  Trial was removed.  Tourniquet was deflated and hemostasis was obtained.  Betadine lavage was then performed followed by one final irrigation.  A portion of the periarticular injection was placed in the posterior capsule as well as the periosteal tissues around the femur and tibia.  Final polyethylene was then placed and secured.    The wound was closed in layers with #1 StrataFix, #0 Vicryl, #2-0 stratafix and staples for skin.  Sterile aquacel dressing was applied. Followed by a full leg ACE wrap.     The patient was taken to the PACU in stable condition. The case was clean.  All sponge and needle counts were correct at the end of the case.    Dispo:  Patient can weight-bear as tolerated.  No formal restrictions.  We will follow cultures.  They will receive DVT prophylaxis with mechanical and  chemical means.     Implant Name Type Inv. Item Serial No.  Lot No. LRB No. Used Action   Tibial insert/poly    Depuy  Left 1 Explanted   Tibial component    Depuy  Left 1 Explanted   Femoral component    Depuy  Left 1 Explanted   STEM UNIVERSAL [id number] FLUTED - [id number] Total Joint Component/Insert STEM UNIVERSAL [id number] FLUTED  & [employer]- [id number] Left 1 Implanted   IMP WEDGE FEM DEPUY POST AUG PFC SZ 4 8MM  - [id number] Total Joint Component/Insert IMP WEDGE FEM DEPUY POST AUG PFC SZ 4 8MM   & [employer]- [id number] Left 1 Implanted   SLEEVE FEMORAL UNIVERSAL FULL POROUS 31M - [id number] Total Joint Component/Insert SLEEVE FEMORAL UNIVERSAL FULL POROUS 31M  & [employer]- [id number] Left 1 Implanted   TRAY TIBIA MBT CEMENTED KEEL SZ 4 3128- - [id number] Total Joint Component/Insert TRAY TIBIA MBT CEMENTED KEEL SZ 4 [id number]  & [employer]- [id number] Left 1 Implanted   STEM UNIVERSAL [id number] FLUTED - [id number] Total Joint Component/Insert STEM UNIVERSAL [id number] FLUTED  & [employer]- [id number] Left 1 Implanted   ADAPTER FEMORAL SIGMA 5DEG - [id number] Total Joint Component/Insert ADAPTER FEMORAL SIGMA 5DEG  & [employer]- [id number] Left 1 Implanted   ADAPTER FEMORAL SIGMA +2/-2 OFFSET BOLT - [id number] Total Joint Component/Insert ADAPTER FEMORAL SIGMA +2/-2 OFFSET BOLT  & [employer]- [id number] Left 1 Implanted   FEMORAL COMPONENT CS TC3 SZ 4 71/65 - [id number] Total Joint Component/Insert FEMORAL COMPONENT CS TC3 SZ 4 71/65  & [employer]- [id number] Left 1 Implanted   SLEEVE TRAY MBT POROUS M/L 61MM - [id number] Total Joint Component/Insert SLEEVE TRAY MBT POROUS M/L 61MM  & [employer]- [id number] Left 1 Implanted   CEMENT BONE SIMPLEX HV 40G W/GENTA .5G - [id number] Cement, Bone CEMENT BONE SIMPLEX HV 40G W/GENTA .5G  [employer] [id number] Left 2 Implanted   INSERT TIBIA TC3 RP SZ 4 15MM - [id number] Total Joint Component/Insert  INSERT TIBIA TC3 RP SZ 4 15MM  [facility]- [id number] Left 1 Implanted       Vasu Guardado DO

## 2021-09-30 NOTE — ANESTHESIA CARE TRANSFER NOTE
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    Anesthesia Type:   No value filed.     Note:    Oropharynx: oropharynx clear of all foreign objects and spontaneously breathing  Level of Consciousness: awake  Oxygen Supplementation: room air  Level of Supplemental Oxygen (L/min / FiO2): 0  Independent Airway: airway patency satisfactory and stable  Dentition: dentition unchanged  Vital Signs Stable: post-procedure vital signs reviewed and stable  Report to RN Given: handoff report given  Patient transferred to: PACU    Handoff Report: Identifed the Patient, Identified the Reponsible Provider, Reviewed the pertinent medical history, Discussed the surgical course, Reviewed Intra-OP anesthesia mangement and issues during anesthesia, Set expectations for post-procedure period and Allowed opportunity for questions and acknowledgement of understanding      Vitals:  Vitals Value Taken Time   /66 09/30/21 1022   Temp 36.4  C (97.6  F) 09/30/21 1021   Pulse 66 09/30/21 1021   Resp 16 09/30/21 1021   SpO2 97 % 09/30/21 1021   Vitals shown include unvalidated device data.    Electronically Signed By: MARCIN Lima CRNA  September 30, 2021  10:23 AM

## 2021-09-30 NOTE — ANESTHESIA PROCEDURE NOTES
Adductor canal Procedure Note    Pre-Procedure   Staff -        Anesthesiologist:  Chencho Rajput MD       Performed By: anesthesiologist       Location: pre-op       Procedure Start/Stop Times: 9/30/2021 6:59 AM and 9/30/2021 7:02 AM       Pre-Anesthestic Checklist: patient identified, IV checked, site marked, risks and benefits discussed, informed consent, monitors and equipment checked, pre-op evaluation, at physician/surgeon's request and post-op pain management  Timeout:       Correct Patient: Yes        Correct Procedure: Yes        Correct Site: Yes        Correct Position: Yes        Correct Laterality: Yes        Site Marked: Yes  Procedure Documentation  Procedure: Adductor canal       Laterality: left       Patient Position: supine       Patient Prep/Sterile Barriers: sterile gloves, mask       Skin prep: Chloraprep       Needle Type: short bevel       Needle Gauge: 21.        Needle Length (Inches): 4        Ultrasound guided       1. Ultrasound was used to identify targeted nerve, plexus, vascular marker, or fascial plane and place a needle adjacent to it in real-time.       2. Ultrasound was used to visualize the spread of anesthetic in close proximity to the above referenced structure.       3. A permanent image is entered into the patient's record.       4. The visualized anatomic structures appeared normal.       5. There were no apparent abnormal pathologic findings.    Assessment/Narrative         The placement was negative for: blood aspirated, painful injection and site bleeding       Paresthesias: No.     Bolus given via needle..        Secured via.        Insertion/Infusion Method: Single Shot       Complications: none       Injection made incrementally with aspirations every 5 mL.    Medication(s) Administered   Bupivacaine 0.5% w/ 1:200K Epi (Other), 15 mL

## 2021-09-30 NOTE — PHARMACY-ADMISSION MEDICATION HISTORY
Pharmacy Note - Admission Medication History    Pertinent Provider Information:    ______________________________________________________________________    Prior To Admission (PTA) med list completed and updated in EMR.       PTA Med List   Medication Sig Last Dose     acetaminophen (TYLENOL) 325 MG tablet [ACETAMINOPHEN (TYLENOL) 325 MG TABLET] Take 2 tablets (650 mg total) by mouth every 4 (four) hours as needed for pain (mild pain). Past Week at Unknown time     aspirin 81 MG EC tablet [ASPIRIN 81 MG EC TABLET] Take 1 tablet (81 mg total) by mouth 2 (two) times a day. (Patient taking differently: Take 81 mg by mouth daily ) 9/19/2021     Lactobacillus rhamnosus GG (CULTURELLE) 10-15 Billion cell capsule [LACTOBACILLUS RHAMNOSUS GG (CULTURELLE) 10-15 BILLION CELL CAPSULE] Take 1 capsule by mouth daily. 9/19/2021     Milk Thistle-Dand-Fennel-Licor (MILK THISTLE XTRA) CAPS capsule Take 1 capsule by mouth daily  9/19/2021     resveratroL 250 mg cap [RESVERATROL 250 MG CAP] Take 250 mg by mouth daily.      simvastatin (ZOCOR) 10 MG tablet [SIMVASTATIN (ZOCOR) 10 MG TABLET] Take 10 mg by mouth at bedtime. 9/29/2021 at Unknown time     TURMERIC ORAL Take 400 mg by mouth daily  9/19/2021     Vitamin D3 (CHOLECALCIFEROL) 125 MCG (5000 UT) tablet Take 125 mcg by mouth daily 9/19/2021       [facility]    Method of interview communication: in-person    Patient was asked about OTC/herbal products specifically.  PTA med list reflects this.    Based on the pharmacist's assessment, the PTA med list information appears reliable    Allergies were reviewed, assessed, and updated with the patient.      Patient does not anticipate needing any multi-use medications during admission.     Thank you for the opportunity to participate in the care of this patient.      Jesus Conner RPH     9/30/2021     7:03 AM

## 2021-09-30 NOTE — PLAN OF CARE
Physical Therapy Discharge Summary    Reason for therapy discharge:    All goals and outcomes met, no further needs identified.    Progress towards therapy goal(s). See goals on Care Plan in [facility] electronic health record for goal details.  Goals met    Therapy recommendation(s):    Continued therapy is recommended.  Rationale/Recommendations:  Paitent not at functional baseline for mobility and should continue with outpatient PT..

## 2021-09-30 NOTE — PLAN OF CARE
"  Problem: Adult Inpatient Plan of Care  Goal: Plan of Care Review  9/30/2021 1743 by Altagracia Velasquez, RN  Outcome: No Change    POD 0- Left total knee revision. Up to floor around 1200. VSS, on RA. C/o pain at a \"1\" in left knee. Ice applied. Ace wrap in place to LLE. Unable to visualize dressing underneath. Pedal pulses present, +2. Up SBA, worked with PT and OT and cleared by both. Should be an early discharge in AM if patient stable overnight. Voiding in urinal or bathroom. Not passing flatus yet per pt. No c/o nausea. Groin with some numbness, but that went away. PIV infusing LR @ 75 mL/hr. Intermittent antibiotics. Discharge pending.      "

## 2021-09-30 NOTE — PROGRESS NOTES
Patient declines need for OT evaluation as he had R TKA revision several months ago.  Will discontinue OT Order at this time.

## 2021-09-30 NOTE — PROGRESS NOTES
09/30/21 1545   Quick Adds   Type of Visit Initial PT Evaluation   Living Environment   People in home spouse   Current Living Arrangements house   Home Accessibility stairs to enter home   Number of Stairs, Main Entrance 2   Stair Railings, Main Entrance railing on left side (ascending)   Living Environment Comments able to stay on one level once inside home   Self-Care   Usual Activity Tolerance excellent   General Information   Onset of Illness/Injury or Date of Surgery 09/30/21   Patient/Family Therapy Goals Statement (PT) to return to home   Pertinent History of Current Problem (include personal factors and/or comorbidities that impact the POC) L TKA revision   Existing Precautions/Restrictions no known precautions/restrictions   Weight-Bearing Status - LLE weight-bearing as tolerated   Cognition   Affect/Mental Status (Cognition) WFL   Follows Commands (Cognition) WFL   Posture    Posture Not impaired   Strength   Manual Muscle Testing Quick Adds No deficits observed during functional mobility   Transfers   Transfers No deficits identified;sit-stand transfer   Sit-Stand Transfer   Sit-Stand Russell (Transfers) modified independence   Gait/Stairs (Locomotion)   Russell Level (Gait) modified independence   Assistive Device (Gait) walker, front-wheeled   Distance in Feet (Required for LE Total Joints) 250   Pattern (Gait) step-through   Negotiation (Stairs) number of steps   Number of Steps (Stairs) 8   Balance   Balance no deficits were identified   Sensory Examination   Sensory Perception patient reports no sensory changes   Coordination   Coordination no deficits were identified   Muscle Tone   Muscle Tone no deficits were identified   Clinical Impression   Criteria for Skilled Therapeutic Intervention yes, treatment indicated   PT Diagnosis (PT) impaired functinal mobilitiy   Influenced by the following impairments weakness, pain   Functional limitations due to impairments transfers, gait    Clinical Presentation Stable/Uncomplicated   Clinical Presentation Rationale Pt. presents as medically diagnosed   Clinical Decision Making (Complexity) low complexity   Therapy Frequency (PT) One time eval and treatment only   Planned Therapy Interventions (PT) home exercise program;strengthening;transfer training;stair training   Anticipated Equipment Needs at Discharge (PT) walker, rolling   Risk & Benefits of therapy have been explained evaluation/treatment results reviewed;care plan/treatment goals reviewed;patient   PT Discharge Planning    PT Discharge Recommendation (DC Rec) home with outpatient physical therapy   PT Rationale for DC Rec Patient mobilzing well, knows protocol, good home setup, supportive family   PT Brief overview of current status  Patient has met acute PT goals. Safe for dc to home when medically ready   Total Evaluation Time   Total Evaluation Time (Minutes) 10

## 2021-09-30 NOTE — ANESTHESIA PREPROCEDURE EVALUATION
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, Z96.659]  OA (osteoarthritis) of knee [M17.10]   Procedure : Procedure(s):  LEFT TOTAL KNEE REVISION     Past Medical History:   Diagnosis Date     Arthritis      Obese       Past Surgical History:   Procedure Laterality Date     APPENDECTOMY       ARTHROPLASTY REVISION KNEE Right 5/10/2021    Procedure: REVISION, RIGHT TOTAL KNEE  ARTHROPLASTY;  Surgeon: Vasu Guardado DO;  Location: [facility] Main OR;  Service: Orthopedics     JOINT REPLACEMENT        No Known Allergies   Social History     Tobacco Use     Smoking status: Never Smoker     Smokeless tobacco: Never Used   Substance Use Topics     Alcohol use: Yes     Comment: Alcoholic Drinks/day: 14 Etoh/week       Wt Readings from Last 1 Encounters:   21 87.2 kg (192 lb 3.2 oz)        Anesthesia Evaluation            ROS/MED HX  ENT/Pulmonary:  - neg pulmonary ROS     Neurologic:  - neg neurologic ROS     Cardiovascular:  - neg cardiovascular ROS     METS/Exercise Tolerance:     Hematologic:  - neg hematologic  ROS     Musculoskeletal:   (+) arthritis,     GI/Hepatic:  - neg GI/hepatic ROS     Renal/Genitourinary:  - neg Renal ROS     Endo:  - neg endo ROS     Psychiatric/Substance Use:  - neg psychiatric ROS     Infectious Disease:  - neg infectious disease ROS     Malignancy:  - neg malignancy ROS     Other:  - neg other ROS          Physical Exam    Airway        Mallampati: II   TM distance: > 3 FB   Neck ROM: full   Mouth opening: > 3 cm    Respiratory Devices and Support         Dental  no notable dental history         Cardiovascular   cardiovascular exam normal          Pulmonary   pulmonary exam normal                OUTSIDE LABS:  CBC:   Lab Results   Component Value Date    HGB 12.5 (L) 2021     BMP: No results found for: NA, POTASSIUM, CHLORIDE, CO2, BUN, CR,  GLC  COAGS:   Lab Results   Component Value Date    INR 1.09 05/10/2021     POC: No results found for: BGM, HCG, HCGS  HEPATIC:   Lab Results   Component Value Date    ALT 34 04/22/2021    AST 34 04/22/2021     OTHER: No results found for: PH, LACT, A1C, MARKO, PHOS, MAG, LIPASE, AMYLASE, TSH, T4, T3, CRP, SED    Anesthesia Plan    ASA Status:  2      Anesthesia Type: Spinal.              Consents    Anesthesia Plan(s) and associated risks, benefits, and realistic alternatives discussed. Questions answered and patient/representative(s) expressed understanding.     - Discussed with:  Patient      - Extended Intubation/Ventilatory Support Discussed: No.      - Patient is DNR/DNI Status: No    Use of blood products discussed: No .     Postoperative Care    Pain management: Peripheral nerve block (Single Shot).        Comments:    Plan for SAB and AC nerve block PSR for POPC            Chencho Rajput MD

## 2021-09-30 NOTE — ANESTHESIA PROCEDURE NOTES
Intrathecal injection Procedure Note    Pre-Procedure   Staff -        Anesthesiologist:  Chencho Rajput MD       Performed By: anesthesiologist       Location: OR       Procedure Start/Stop Times: 9/30/2021 7:29 AM and 9/30/2021 7:32 AM       Pre-Anesthestic Checklist: patient identified, IV checked, risks and benefits discussed, informed consent, monitors and equipment checked, pre-op evaluation, at physician/surgeon's request and post-op pain management  Timeout:       Correct Patient: Yes        Correct Procedure: Yes        Correct Site: Yes        Correct Position: Yes   Procedure Documentation  Procedure: intrathecal injection       Patient Position: sitting       Skin prep: Chloraprep       Insertion Site: L3-4. (midline approach).       Needle Gauge: 25.        Needle Length (Inches): 3.5        Spinal Needle Type: Pencan       Introducer used       # of attempts: 1 and  # of redirects:  1    Assessment/Narrative         Paresthesias: Yes and No.       CSF fluid: clear.    Medication(s) Administered   0.75% Hyperbaric Bupivacaine (Intrathecal), 2 mL  Medication Administration Time: 9/30/2021 7:32 AM

## 2021-10-01 VITALS
BODY MASS INDEX: 28.47 KG/M2 | RESPIRATION RATE: 20 BRPM | HEART RATE: 64 BPM | DIASTOLIC BLOOD PRESSURE: 85 MMHG | SYSTOLIC BLOOD PRESSURE: 120 MMHG | TEMPERATURE: 98.1 F | HEIGHT: 69 IN | OXYGEN SATURATION: 95 % | WEIGHT: 192.2 LBS

## 2021-10-01 LAB
FASTING STATUS PATIENT QL REPORTED: YES
GLUCOSE BLD-MCNC: 127 MG/DL (ref 70–125)
HGB BLD-MCNC: 13.9 G/DL (ref 13.3–17.7)

## 2021-10-01 PROCEDURE — 36415 COLL VENOUS BLD VENIPUNCTURE: CPT | Performed by: PHYSICIAN ASSISTANT

## 2021-10-01 PROCEDURE — 250N000013 HC RX MED GY IP 250 OP 250 PS 637: Performed by: PHYSICIAN ASSISTANT

## 2021-10-01 PROCEDURE — 250N000011 HC RX IP 250 OP 636: Performed by: PHYSICIAN ASSISTANT

## 2021-10-01 PROCEDURE — 82947 ASSAY GLUCOSE BLOOD QUANT: CPT | Performed by: ORTHOPAEDIC SURGERY

## 2021-10-01 PROCEDURE — 85018 HEMOGLOBIN: CPT | Performed by: PHYSICIAN ASSISTANT

## 2021-10-01 RX ORDER — OXYCODONE HYDROCHLORIDE 5 MG/1
TABLET ORAL
Qty: 30 TABLET | Refills: 0 | Status: SHIPPED | OUTPATIENT
Start: 2021-10-01

## 2021-10-01 RX ADMIN — OXYCODONE HYDROCHLORIDE 5 MG: 5 TABLET ORAL at 08:36

## 2021-10-01 RX ADMIN — ACETAMINOPHEN 975 MG: 325 TABLET ORAL at 06:10

## 2021-10-01 RX ADMIN — SENNOSIDES AND DOCUSATE SODIUM 1 TABLET: 50; 8.6 TABLET ORAL at 08:36

## 2021-10-01 RX ADMIN — CEFAZOLIN SODIUM 2 G: 2 INJECTION, SOLUTION INTRAVENOUS at 00:09

## 2021-10-01 RX ADMIN — POLYETHYLENE GLYCOL 3350 17 G: 17 POWDER, FOR SOLUTION ORAL at 08:36

## 2021-10-01 RX ADMIN — ASPIRIN 81 MG: 81 TABLET, COATED ORAL at 08:36

## 2021-10-01 RX ADMIN — OXYCODONE HYDROCHLORIDE 5 MG: 5 TABLET ORAL at 04:13

## 2021-10-01 RX ADMIN — OXYCODONE HYDROCHLORIDE 5 MG: 5 TABLET ORAL at 00:08

## 2021-10-01 NOTE — DISCHARGE SUMMARY
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 Guardado on 21. There were no intraoperative complications and the patient was transferred to the recovery room and later the orthopedic unit in stable condition. Once the patient reached the orthopedic floor our orthopedic pain protocol was implemented along with the following:    Anticoagulation Medications: ASA  Therapy: PT and OT  Activity: WBAT  Bracing: None    Consultations during Admission: Hospitalist service for medical management     COMPLICATIONS/SIGNIFICANT FINDINGS    None    DISCHARGE INFORMATION   Condition at discharge: Good  Discharge destination: Home  Patient was seen by myself on the date of discharge.    FOLLOW UP CARE   Follow up with orthopedics in 2 weeks or sooner should the need arise. Ortho will continue to manage pain control, post op anticoagulation and incision care.     Follow up with your PCP for management of chronic medical problems and to evaluate for post op medical complications including constipation, nausea/vomiting, DVT/PE, anemia, changes in blood pressure, fevers/chills, urinary retention and atelectasis/pneumonia.     Subjective   Patient is doing well today. Patient has passed HIS therapies. HE is using OXYCODONE for pain which HE is tolerating well. HE feels ready to discharge home. No other complaints. All questions answered.     Physical Exam   The patient is A&Ox3.  Sensation is intact.  Dorsiflexion and  "plantar flexion is intact.  Dorsalis pedis pulse intact.  Calves are soft and non-tender.   The incision is covered. Dressing C/D/I    /85 (BP Location: Right arm)   Pulse 64   Temp 98.1  F (36.7  C) (Oral)   Resp 20   Ht 1.74 m (5' 8.5\")   Wt 87.2 kg (192 lb 3.2 oz)   SpO2 95%   BMI 28.80 kg/m      Pertinent Results at Discharge     Hemoglobin   Date/Time Value Ref Range Status   10/01/2021 04:28 AM 13.9 13.3 - 17.7 g/dL Final   05/11/2021 07:08 AM 12.5 (L) 14.0 - 18.0 g/dL Final     INR   Date/Time Value Ref Range Status   05/10/2021 11:40 AM 1.09 0.90 - 1.10 Final       Medications at Discharge      Satya Maldonado   Home Medication Instructions ANGELITO:[id number]    Printed on:10/01/21 0831   Medication Information                      acetaminophen (TYLENOL) 325 MG tablet  Take 2 tablets (650 mg) by mouth every 4 hours as needed for other (mild pain)             aspirin 81 MG EC tablet  [ASPIRIN 81 MG EC TABLET] Take 1 tablet (81 mg total) by mouth 2 (two) times a day.             hydrOXYzine (ATARAX) 10 MG tablet  Take 1 tablet (10 mg) by mouth every 6 hours as needed for itching or anxiety (with pain, moderate pain)             ibuprofen (ADVIL/MOTRIN) 600 MG tablet  Take 1 tablet (600 mg) by mouth every 6 hours as needed for pain (mild)             Lactobacillus rhamnosus GG (CULTURELLE) 10-15 Billion cell capsule  [LACTOBACILLUS RHAMNOSUS GG (CULTURELLE) 10-15 BILLION CELL CAPSULE] Take 1 capsule by mouth daily.             Milk Thistle-Dand-Fennel-Licor (MILK THISTLE XTRA) CAPS capsule  Take 1 capsule by mouth daily              oxyCODONE (ROXICODONE) 5 MG tablet  Take 1-2 tabs every 4-6 hours as needed for pain. Do not exceed 6 tabs in 24 hours.             resveratroL 250 mg cap  [RESVERATROL 250 MG CAP] Take 250 mg by mouth daily.             senna-docusate (SENOKOT-S/PERICOLACE) 8.6-50 MG tablet  Take 1-2 tablets by mouth 2 times daily Take while on oral narcotics to prevent or treat " constipation.             simvastatin (ZOCOR) 10 MG tablet  [SIMVASTATIN (ZOCOR) 10 MG TABLET] Take 10 mg by mouth at bedtime.             TURMERIC ORAL  Take 400 mg by mouth daily              Vitamin D3 (CHOLECALCIFEROL) 125 MCG (5000 UT) tablet  Take 125 mcg by mouth daily                 Problem List   Active Problems:    Failed total left knee replacement, subsequent encounter        Estefany Coronel PA-C, BRY  Date: 10/1/2021  Time: 8:31 AM

## 2021-10-01 NOTE — PLAN OF CARE
Patient vss, LS clear, BS active, voiding well. IV SL. Tolerating food and fluids, CMS intact, ace wrap removed this morning. Pain 4/10 left knee given Tylenol and prn Oxycodone. Ice pack applied. Moving well with stand by assist. Plans to have an early discharge today as he passed therapies.   Problem: Adjustment to Illness (Knee Arthroplasty)  Goal: Optimal Coping  Outcome: Improving     Problem: Pain (Knee Arthroplasty)  Goal: Acceptable Pain Control  10/1/2021 0441 by Negra Hassan RN  Outcome: Improving  9/30/2021 2335 by Negra Hassan RN  Outcome: Improving  Intervention: Prevent or Manage Pain  Recent Flowsheet Documentation  Taken 10/1/2021 0008 by Negra Hassan RN  Pain Management Interventions:    medication (see MAR)    pillow support provided    cold applied  Taken 9/30/2021 2128 by Negra Hassan RN  Pain Management Interventions:    medication (see MAR)    cold applied     Problem: Adult Inpatient Plan of Care  Goal: Absence of Hospital-Acquired Illness or Injury  Intervention: Identify and Manage Fall Risk  Recent Flowsheet Documentation  Taken 10/1/2021 0010 by Negra Hassan RN  Safety Promotion/Fall Prevention:    activity supervised    fall prevention program maintained    lighting adjusted    nonskid shoes/slippers when out of bed    patient and family education    room near nurse's station  Taken 9/30/2021 2130 by Negra Hassan RN  Safety Promotion/Fall Prevention:    activity supervised    fall prevention program maintained    lighting adjusted    nonskid shoes/slippers when out of bed    patient and family education    room near nurse's station  Intervention: Prevent Skin Injury  Recent Flowsheet Documentation  Taken 10/1/2021 0008 by Negra Hassan RN  Body Position: supine, head elevated  Taken 9/30/2021 2128 by Negra Hassan RN  Body Position: supine, head elevated  Intervention: Prevent and Manage VTE (Venous Thromboembolism) Risk  Recent Flowsheet  Documentation  Taken 10/1/2021 0010 by Negra Hassan, RN  VTE Prevention/Management: foot pump device on  Taken 9/30/2021 2130 by Negra Hassan, RN  VTE Prevention/Management: foot pump device on

## 2021-10-01 NOTE — PLAN OF CARE
Patient vital signs are at baseline: Yes  Patient able to ambulate as they were prior to admission or with assist devices provided by therapies during their stay:  Yes  Patient MUST void prior to discharge:  Yes  Patient able to tolerate oral intake:  Yes  Pain has adequate pain control using Oral analgesics:  Yes    Patient is doing very well, pain is minimal, walking hallway, plans to discharge home in the morning.

## 2021-10-01 NOTE — PLAN OF CARE
Patient vital signs are at baseline: Yes  Patient able to ambulate as they were prior to admission or with assist devices provided by therapies during their stay:  Yes  Patient MUST void prior to discharge:  Yes  Patient able to tolerate oral intake:  Yes  Pain has adequate pain control using Oral analgesics:  Yes     Pt is doing extremely well!  Passed PT/OT. Ready to discharge  home today with family. Only pt present at discharge. Pt verbalizes discharge and follow up instructions.  All belongings  were  sent home with the patient.

## 2021-10-01 NOTE — PROGRESS NOTES
Care Management Discharge Note    Discharge Date: 10/01/2021       Discharge Disposition:  Home.    Discharge Services:  None    Discharge DME:  Per therapy (if indicated).    Discharge Transportation:   Family.    Private pay costs discussed: Not applicable    PAS Confirmation Code:  NA  Patient/family educated on Medicare website which has current facility and service quality ratings:  NA    Education Provided on the Discharge Plan:  Per team.  Persons Notified of Discharge Plans: Nursing  Patient/Family in Agreement with the Plan:  Yes    Handoff Referral Completed: Yes    Additional Information:  Patient is  and independent with activities of daily living at baseline. Therapy agrees with a home discharge, family support.  No care management needs identified.      Nneka Garcia RN

## 2021-10-03 LAB
BACTERIA SNV CULT: NO GROWTH
BACTERIA SNV CULT: NORMAL
BACTERIA TISS BX CULT: NO GROWTH
BACTERIA TISS BX CULT: NORMAL

## 2022-01-12 VITALS — BODY MASS INDEX: 28.78 KG/M2 | WEIGHT: 194.31 LBS | HEIGHT: 69 IN

## (undated) DEVICE — CUSTOM PACK TOTAL KNEE ACCESSORY SOP5BTAHEA

## (undated) DEVICE — GLOVE UNDER INDICATOR PI SZ 6.5 LF 41665

## (undated) DEVICE — GLOVE BIOGEL PI SZ 6.5 40865

## (undated) DEVICE — BLADE RECIPRO DBL SIDED 277-96-275

## (undated) DEVICE — SU STRATAFIX PDS PLUS 1 CT-1 18" SXPP1A404

## (undated) DEVICE — BANDAGE ELASTIC 6X550 LF DBL 593-96LF

## (undated) DEVICE — DECANTER VIAL 2006S

## (undated) DEVICE — SUTURE VICRYL+ 0 27IN CT-1 UND VCP260H

## (undated) DEVICE — CUSTOM PACK TOTAL KNEE SOP5BTKHEC

## (undated) DEVICE — GLOVE UNDER INDICATOR PI SZ 8.5 LF 41685

## (undated) DEVICE — SOL WATER IRRIG 1000ML BOTTLE 2F7114

## (undated) DEVICE — SU ETHIBOND 1 CT-1 30" X425H

## (undated) DEVICE — GLOVE BIOGEL PI SZ 8.5 40885

## (undated) DEVICE — SU ETHIBOND 5 V-37 4X30" MB66G

## (undated) DEVICE — A3 SUPPLIES- SEE NURSING INFO PAGE

## (undated) DEVICE — BLADE SAW SAGITTAL STRK WIDE 25.4X85X1.2MM 2108-151-000

## (undated) DEVICE — SOL NACL 0.9% IRRIG 1000ML BOTTLE 2F7124

## (undated) DEVICE — GOWN IMPERVIOUS BREATHABLE SMART XLG 89045

## (undated) DEVICE — SUTURE VICRYL+ 2-0 27IN CT-1 UND VCP259H

## (undated) DEVICE — GLOVE BIOGEL PI INDICATOR 8.0 LF 41680

## (undated) DEVICE — STPL SKIN 35W 6.9MM  PXW35

## (undated) DEVICE — SUCTION MANIFOLD NEPTUNE 2 SYS 4 PORT 0702-020-000

## (undated) DEVICE — HOOD FLYTE SURGICOOL

## (undated) DEVICE — GLOVE BIOGEL PI SZ 8.0 40880

## (undated) DEVICE — HOLDER LIMB VELCRO OR 0814-1533

## (undated) DEVICE — SU STRATAFIX PDS PLUS 2-0 SPIRAL CT-1 30CM SXPP1B410

## (undated) DEVICE — SOL NACL 0.9% INJ 1000ML BAG 2B1324X

## (undated) DEVICE — PLATE GROUNDING ADULT W/CORD 9165L

## (undated) DEVICE — HOOD FLYTE SURGICOOL WITH PEEL AWAY

## (undated) DEVICE — DRSG AQUACEL AG 3.5X12" HYDROFIBER 420670

## (undated) DEVICE — TOURNIQUET SGL  BLADDER 30"X4" BLUE 5921030135